# Patient Record
Sex: FEMALE | URBAN - METROPOLITAN AREA
[De-identification: names, ages, dates, MRNs, and addresses within clinical notes are randomized per-mention and may not be internally consistent; named-entity substitution may affect disease eponyms.]

---

## 2020-07-25 ENCOUNTER — TELEPHONE ENCOUNTER (OUTPATIENT)
Dept: URBAN - METROPOLITAN AREA CLINIC 13 | Facility: CLINIC | Age: 70
End: 2020-07-25

## 2020-07-25 RX ORDER — CALCIUM CITRATE/VITAMIN D3 200MG-6.25
TAKE 1 TABLET DAILY./ PER PATIENT UNKNOWN STRENGTH TABLET ORAL
Refills: 0 | OUTPATIENT
End: 2016-08-29

## 2020-07-25 RX ORDER — DIPHENHYDRAMINE HCL, IBUPROFEN 25; 200 MG/1; MG/1
TAKE 1 CAPSULE BEDTIME PRN CAPSULE, LIQUID FILLED ORAL
Refills: 0 | OUTPATIENT
End: 2019-10-25

## 2020-07-25 RX ORDER — POLYETHYLENE GLYCOL 3350, SODIUM SULFATE, SODIUM CHLORIDE, POTASSIUM CHLORIDE, ASCORBIC ACID, SODIUM ASCORBATE 7.5-2.691G
USE AS DIRECTED KIT ORAL
Qty: 1 | Refills: 0 | OUTPATIENT
Start: 2012-05-16 | End: 2012-06-18

## 2020-07-25 RX ORDER — PRAVASTATIN SODIUM 40 MG/1
TAKE 1 TABLET DAILY TABLET ORAL
Refills: 0 | OUTPATIENT
Start: 2019-02-20 | End: 2019-11-08

## 2020-07-25 RX ORDER — HYDROCHLOROTHIAZIDE 12.5 MG/1
TAKE 1 CAPSULE BY MOUTH DAILY CAPSULE ORAL
Qty: 30 | Refills: 0 | OUTPATIENT
Start: 2011-05-02 | End: 2019-10-25

## 2020-07-25 RX ORDER — POTASSIUM GLUCONATE 595(99)MG
TAKE 1 TABLET DAILY AS DIRECTED TABLET ORAL
Refills: 0 | OUTPATIENT
End: 2016-08-29

## 2020-07-25 RX ORDER — RISEDRONATE SODIUM 129; 21 MG/1; MG/1
TAKE 1 TABLET EVERY MONTH TABLET, FILM COATED ORAL
Qty: 1 | Refills: 0 | OUTPATIENT
Start: 2012-04-30 | End: 2019-09-11

## 2020-07-25 RX ORDER — CARISOPRODOL 350 MG/1
TAKE 1 TABLET 3 TIMES DAILY AS NEEDED TABLET ORAL
Refills: 0 | OUTPATIENT
End: 2019-11-08

## 2020-07-25 RX ORDER — SODIUM SULFATE, POTASSIUM SULFATE, MAGNESIUM SULFATE 17.5; 3.13; 1.6 G/ML; G/ML; G/ML
5PM THE DAY BEFORE PROCEDURE, DRINK 1/2 OF PREP, THEN 6HOURS BEFORE PROCEDURE DRINK REMAINDER OF PREP SOLUTION, CONCENTRATE ORAL
Qty: 1 | Refills: 0 | OUTPATIENT
Start: 2019-10-07 | End: 2019-11-08

## 2020-07-25 RX ORDER — ESOMEPRAZOLE MAGNESIUM 20 MG/1
TAKE 1 CAPSULE ONCE DAILY CAPSULE, DELAYED RELEASE ORAL
Refills: 0 | OUTPATIENT
End: 2019-10-25

## 2020-07-26 ENCOUNTER — TELEPHONE ENCOUNTER (OUTPATIENT)
Dept: URBAN - METROPOLITAN AREA CLINIC 13 | Facility: CLINIC | Age: 70
End: 2020-07-26

## 2020-07-26 RX ORDER — ROSUVASTATIN CALCIUM 5 MG/1
TABLET, FILM COATED ORAL
Qty: 90 | Refills: 0 | Status: ACTIVE | COMMUNITY
Start: 2019-05-28

## 2020-07-26 RX ORDER — PRAVASTATIN SODIUM 40 MG/1
TABLET ORAL
Qty: 90 | Refills: 0 | Status: ACTIVE | COMMUNITY
Start: 2018-11-25

## 2020-07-26 RX ORDER — ALPRAZOLAM 0.5 MG/1
TABLET ORAL
Qty: 20 | Refills: 0 | Status: ACTIVE | COMMUNITY
Start: 2019-08-27

## 2020-07-26 RX ORDER — HYDROCODONE BITARTRATE AND IBUPROFEN 7.5; 2 MG/1; MG/1
TAKE ONE TABLET EVERY 4-6 HOURS AS NEEDED FOR PAIN TABLET ORAL
Qty: 20 | Refills: 0 | Status: ACTIVE | COMMUNITY
Start: 2011-08-09

## 2020-07-26 RX ORDER — CARISOPRODOL 350 MG/1
TABLET ORAL
Qty: 30 | Refills: 0 | Status: ACTIVE | COMMUNITY
Start: 2012-07-18

## 2020-07-26 RX ORDER — ESOMEPRAZOLE MAGNESIUM 40 MG
TAKE 1 CAPSULE DAILY CAPSULE,DELAYED RELEASE (ENTERIC COATED) ORAL
Refills: 3 | Status: ACTIVE | COMMUNITY

## 2020-07-26 RX ORDER — MUPIROCIN 20 MG/G
OINTMENT TOPICAL
Qty: 22 | Refills: 0 | Status: ACTIVE | COMMUNITY
Start: 2019-08-19

## 2020-07-26 RX ORDER — ALPRAZOLAM 0.5 MG/1
TABLET ORAL
Qty: 21 | Refills: 0 | Status: ACTIVE | COMMUNITY
Start: 2019-03-28

## 2020-07-26 RX ORDER — CEPHALEXIN 500 MG/1
CAPSULE ORAL
Qty: 20 | Refills: 0 | Status: ACTIVE | COMMUNITY
Start: 2019-08-28

## 2020-07-26 RX ORDER — CARISOPRODOL 250 MG/1
TAKE 1 TABLET 3 TIMES DAILY PRN TABLET ORAL
Refills: 0 | Status: ACTIVE | COMMUNITY

## 2020-07-26 RX ORDER — ESOMEPRAZOLE MAGNESIUM 40 MG/1
CAPSULE, DELAYED RELEASE PELLETS ORAL
Qty: 30 | Refills: 0 | Status: ACTIVE | COMMUNITY
Start: 2019-10-16

## 2020-07-26 RX ORDER — IBANDRONATE SODIUM 150 MG/1
TABLET, FILM COATED ORAL
Qty: 3 | Refills: 0 | Status: ACTIVE | COMMUNITY
Start: 2019-05-28

## 2025-03-19 PROBLEM — 312824007: Status: ACTIVE | Noted: 2025-03-19

## 2025-03-20 ENCOUNTER — LAB OUTSIDE AN ENCOUNTER (OUTPATIENT)
Dept: URBAN - METROPOLITAN AREA CLINIC 113 | Facility: CLINIC | Age: 75
End: 2025-03-20

## 2025-03-20 ENCOUNTER — DASHBOARD ENCOUNTERS (OUTPATIENT)
Age: 75
End: 2025-03-20

## 2025-03-20 ENCOUNTER — OFFICE VISIT (OUTPATIENT)
Dept: URBAN - METROPOLITAN AREA CLINIC 113 | Facility: CLINIC | Age: 75
End: 2025-03-20
Payer: MEDICARE

## 2025-03-20 VITALS
HEART RATE: 74 BPM | BODY MASS INDEX: 29.64 KG/M2 | WEIGHT: 173.6 LBS | TEMPERATURE: 97.2 F | SYSTOLIC BLOOD PRESSURE: 118 MMHG | DIASTOLIC BLOOD PRESSURE: 61 MMHG | RESPIRATION RATE: 18 BRPM | HEIGHT: 64 IN

## 2025-03-20 DIAGNOSIS — D12.2 ADENOMATOUS POLYP OF ASCENDING COLON: ICD-10-CM

## 2025-03-20 DIAGNOSIS — Z80.0 FAMILY HISTORY OF COLON CANCER: ICD-10-CM

## 2025-03-20 PROCEDURE — 99203 OFFICE O/P NEW LOW 30 MIN: CPT | Performed by: INTERNAL MEDICINE

## 2025-03-20 RX ORDER — BUPROPION HYDROCHLORIDE 150 MG/1
1 TABLET IN THE MORNING TABLET, FILM COATED ORAL ONCE A DAY
Status: ACTIVE | COMMUNITY

## 2025-03-20 RX ORDER — SODIUM, POTASSIUM,MAG SULFATES 17.5-3.13G
AS DIRECTED SOLUTION, RECONSTITUTED, ORAL ORAL ONCE
Qty: 177 | Refills: 0 | OUTPATIENT
Start: 2025-03-20 | End: 2025-03-21

## 2025-03-20 RX ORDER — IBANDRONATE SODIUM 150 MG/1
TABLET, FILM COATED ORAL
Qty: 3 | Refills: 0 | Status: ON HOLD | COMMUNITY
Start: 2019-05-28

## 2025-03-20 RX ORDER — ESOMEPRAZOLE MAGNESIUM 40 MG/1
CAPSULE, DELAYED RELEASE PELLETS ORAL
Qty: 30 | Refills: 0 | Status: ACTIVE | COMMUNITY
Start: 2019-10-16

## 2025-03-20 RX ORDER — MUPIROCIN 20 MG/G
OINTMENT TOPICAL
Qty: 22 | Refills: 0 | Status: ON HOLD | COMMUNITY
Start: 2019-08-19

## 2025-03-20 RX ORDER — ALPRAZOLAM 0.5 MG/1
TABLET ORAL
Qty: 21 | Refills: 0 | Status: ON HOLD | COMMUNITY
Start: 2019-03-28

## 2025-03-20 RX ORDER — CARISOPRODOL 250 MG/1
TAKE 1 TABLET 3 TIMES DAILY PRN TABLET ORAL
Refills: 0 | Status: ACTIVE | COMMUNITY

## 2025-03-20 RX ORDER — HYDROCODONE BITARTRATE AND IBUPROFEN 7.5; 2 MG/1; MG/1
TAKE ONE TABLET EVERY 4-6 HOURS AS NEEDED FOR PAIN TABLET ORAL
Qty: 20 | Refills: 0 | Status: ON HOLD | COMMUNITY
Start: 2011-08-09

## 2025-03-20 RX ORDER — CEPHALEXIN 500 MG/1
CAPSULE ORAL
Qty: 20 | Refills: 0 | Status: ACTIVE | COMMUNITY
Start: 2019-08-28

## 2025-03-20 RX ORDER — PRAVASTATIN SODIUM 40 MG/1
TABLET ORAL
Qty: 90 | Refills: 0 | Status: ON HOLD | COMMUNITY
Start: 2018-11-25

## 2025-03-20 RX ORDER — ESOMEPRAZOLE MAGNESIUM 40 MG
TAKE 1 CAPSULE DAILY CAPSULE,DELAYED RELEASE (ENTERIC COATED) ORAL
Refills: 3 | Status: ON HOLD | COMMUNITY

## 2025-03-20 RX ORDER — ROSUVASTATIN CALCIUM 5 MG/1
TABLET, FILM COATED ORAL
Qty: 90 | Refills: 0 | Status: ACTIVE | COMMUNITY
Start: 2019-05-28

## 2025-03-20 RX ORDER — CARISOPRODOL 350 MG/1
TABLET ORAL
Qty: 30 | Refills: 0 | Status: ON HOLD | COMMUNITY
Start: 2012-07-18

## 2025-03-20 NOTE — HPI-OTHER HISTORIES
Colonoscopy on 1/6/2020 using Suprep revealed a normal terminal ileum, small grade 1 internal hemorrhoids.  There were 4 tubular adenomas 2 to 4 mm in size in the cecum and ascending colon.  3 were in the cecum and one was in the ascending colon.  There was a 4 mm tubular adenoma in the distal transverse colon.

## 2025-03-20 NOTE — HPI-TODAY'S VISIT:
74-year-old with history of arthritis, hypertension and hyperlipidemia who presents with a history of adenomatous colon polyps and a distant family history of colon cancer.  She is doing well and has had no new medical problems.  There is been no heart or lung problems.  There is been no new surgeries.  As long as she takes Nexium over-the-counter every day she has no heartburn or dysphagia.  There is no abdominal pain.  She was taking Advil's for arthritis but it caused burning in her stomach and she discontinued it.  The burning is all resolved.  There is no nausea or vomiting.  She does have a bowel movement every day there is been no blood or melena.  Paternal grandfather had colon cancer in his 80s.  Blood work on 2/25/2025 revealed a hemoglobin 11.7, WBC of 5.3 and platelet count 212,000.  Sodium 138 potassium 4.7 BUN 32 creatinine 1.05.  AST 18, ALT 12, alkaline phosphatase 74, total bili 0.2, albumin 4.2.

## 2025-05-07 ENCOUNTER — CLAIMS CREATED FROM THE CLAIM WINDOW (OUTPATIENT)
Dept: URBAN - METROPOLITAN AREA CLINIC 4 | Facility: CLINIC | Age: 75
End: 2025-05-07
Payer: MEDICARE

## 2025-05-07 ENCOUNTER — CLAIMS CREATED FROM THE CLAIM WINDOW (OUTPATIENT)
Dept: URBAN - METROPOLITAN AREA SURGERY CENTER 25 | Facility: SURGERY CENTER | Age: 75
End: 2025-05-07
Payer: MEDICARE

## 2025-05-07 DIAGNOSIS — Z09 ENCOUNTER FOR FOLLOW-UP EXAMINATION AFTER COMPLETED TREATMENT FOR CONDITIONS OTHER THAN MALIGNANT NEOPLASM: ICD-10-CM

## 2025-05-07 DIAGNOSIS — D12.4 BENIGN NEOPLASM OF DESCENDING COLON: ICD-10-CM

## 2025-05-07 DIAGNOSIS — Z86.0100 PERSONAL HISTORY OF COLONIC POLYPS: ICD-10-CM

## 2025-05-07 DIAGNOSIS — D12.4 ADENOMA OF DESCENDING COLON: ICD-10-CM

## 2025-05-07 DIAGNOSIS — D12.0 BENIGN NEOPLASM OF CECUM: ICD-10-CM

## 2025-05-07 DIAGNOSIS — Z86.0101 H/O ADENOMATOUS POLYP OF COLON: ICD-10-CM

## 2025-05-07 DIAGNOSIS — Z86.0101 PERSONAL HISTORY OF ADENOMATOUS AND SERRATED COLON POLYPS: ICD-10-CM

## 2025-05-07 DIAGNOSIS — Z09 ENCNTR FOR F/U EXAM AFT TRTMT FOR COND OTH THAN MALIG NEOPLM: ICD-10-CM

## 2025-05-07 DIAGNOSIS — D12.0 ADENOMA OF CECUM: ICD-10-CM

## 2025-05-07 PROCEDURE — 00811 ANES LWR INTST NDSC NOS: CPT | Performed by: NURSE ANESTHETIST, CERTIFIED REGISTERED

## 2025-05-07 PROCEDURE — 0529F INTRVL 3/>YR PTS CLNSCP DOCD: CPT | Performed by: INTERNAL MEDICINE

## 2025-05-07 PROCEDURE — 0529F INTRVL 3/>YR PTS CLNSCP DOCD: CPT | Performed by: CLINIC/CENTER

## 2025-05-07 PROCEDURE — 88305 TISSUE EXAM BY PATHOLOGIST: CPT | Performed by: PATHOLOGY

## 2025-05-07 PROCEDURE — 45385 COLONOSCOPY W/LESION REMOVAL: CPT | Performed by: CLINIC/CENTER

## 2025-05-07 PROCEDURE — 45385 COLONOSCOPY W/LESION REMOVAL: CPT | Performed by: INTERNAL MEDICINE

## 2025-05-07 RX ORDER — ALPRAZOLAM 0.5 MG/1
TABLET ORAL
Qty: 21 | Refills: 0 | Status: ON HOLD | COMMUNITY
Start: 2019-03-28

## 2025-05-07 RX ORDER — HYDROCODONE BITARTRATE AND IBUPROFEN 7.5; 2 MG/1; MG/1
TAKE ONE TABLET EVERY 4-6 HOURS AS NEEDED FOR PAIN TABLET ORAL
Qty: 20 | Refills: 0 | Status: ON HOLD | COMMUNITY
Start: 2011-08-09

## 2025-05-07 RX ORDER — PRAVASTATIN SODIUM 40 MG/1
TABLET ORAL
Qty: 90 | Refills: 0 | Status: ON HOLD | COMMUNITY
Start: 2018-11-25

## 2025-05-07 RX ORDER — CARISOPRODOL 250 MG/1
TAKE 1 TABLET 3 TIMES DAILY PRN TABLET ORAL
Refills: 0 | Status: ACTIVE | COMMUNITY

## 2025-05-07 RX ORDER — ESOMEPRAZOLE MAGNESIUM 40 MG
TAKE 1 CAPSULE DAILY CAPSULE,DELAYED RELEASE (ENTERIC COATED) ORAL
Refills: 3 | Status: ON HOLD | COMMUNITY

## 2025-05-07 RX ORDER — IBANDRONATE SODIUM 150 MG/1
TABLET, FILM COATED ORAL
Qty: 3 | Refills: 0 | Status: ON HOLD | COMMUNITY
Start: 2019-05-28

## 2025-05-07 RX ORDER — CEPHALEXIN 500 MG/1
CAPSULE ORAL
Qty: 20 | Refills: 0 | Status: ACTIVE | COMMUNITY
Start: 2019-08-28

## 2025-05-07 RX ORDER — ESOMEPRAZOLE MAGNESIUM 40 MG/1
CAPSULE, DELAYED RELEASE PELLETS ORAL
Qty: 30 | Refills: 0 | Status: ACTIVE | COMMUNITY
Start: 2019-10-16

## 2025-05-07 RX ORDER — ROSUVASTATIN CALCIUM 5 MG/1
TABLET, FILM COATED ORAL
Qty: 90 | Refills: 0 | Status: ACTIVE | COMMUNITY
Start: 2019-05-28

## 2025-05-07 RX ORDER — BUPROPION HYDROCHLORIDE 150 MG/1
1 TABLET IN THE MORNING TABLET, FILM COATED ORAL ONCE A DAY
Status: ACTIVE | COMMUNITY

## 2025-05-07 RX ORDER — MUPIROCIN 20 MG/G
OINTMENT TOPICAL
Qty: 22 | Refills: 0 | Status: ON HOLD | COMMUNITY
Start: 2019-08-19

## 2025-05-07 RX ORDER — CARISOPRODOL 350 MG/1
TABLET ORAL
Qty: 30 | Refills: 0 | Status: ON HOLD | COMMUNITY
Start: 2012-07-18

## 2025-06-20 ENCOUNTER — OFFICE VISIT (OUTPATIENT)
Dept: URBAN - METROPOLITAN AREA CLINIC 113 | Facility: CLINIC | Age: 75
End: 2025-06-20